# Patient Record
(demographics unavailable — no encounter records)

---

## 2024-04-24 NOTE — PROGRESS NOTES
4/24/24 1030  Received self-referral for this patient with neurofibromatosis. I spoke with mom who states that patient was followed at [facility] in [place] and has since transitioned to the adult hospital but she is unhappy with the provider and they only have one provider there. She would like a second opinion. Per mom, patient has had a shunt placed and 3 tumors removed since her dx in 2019. She also has had MRIs. She said she had started on some treatment and had an allergic reaction for which she is going today to an allergist. I am working on obtaining medical records to get more info and then will work to get her scheduled. They are willing to come to [place] for consult. PEDRO Mondragon